# Patient Record
Sex: FEMALE | Race: OTHER | NOT HISPANIC OR LATINO
[De-identification: names, ages, dates, MRNs, and addresses within clinical notes are randomized per-mention and may not be internally consistent; named-entity substitution may affect disease eponyms.]

---

## 2017-02-28 ENCOUNTER — APPOINTMENT (OUTPATIENT)
Dept: INTERNAL MEDICINE | Facility: CLINIC | Age: 22
End: 2017-02-28

## 2017-02-28 VITALS
BODY MASS INDEX: 18.5 KG/M2 | HEIGHT: 61 IN | RESPIRATION RATE: 16 BRPM | HEART RATE: 87 BPM | WEIGHT: 98 LBS | DIASTOLIC BLOOD PRESSURE: 58 MMHG | SYSTOLIC BLOOD PRESSURE: 110 MMHG | TEMPERATURE: 98.6 F | OXYGEN SATURATION: 98 %

## 2017-02-28 DIAGNOSIS — R05 COUGH: ICD-10-CM

## 2017-02-28 DIAGNOSIS — H92.09 OTALGIA, UNSPECIFIED EAR: ICD-10-CM

## 2017-02-28 RX ORDER — CARBAMIDE PEROXIDE 6.5 %
6.5 DROPS OTIC (EAR)
Qty: 1 | Refills: 0 | Status: ACTIVE | COMMUNITY
Start: 2017-02-28 | End: 1900-01-01

## 2017-03-02 ENCOUNTER — APPOINTMENT (OUTPATIENT)
Dept: UROLOGY | Facility: CLINIC | Age: 22
End: 2017-03-02

## 2017-03-02 ENCOUNTER — OUTPATIENT (OUTPATIENT)
Dept: OUTPATIENT SERVICES | Facility: HOSPITAL | Age: 22
LOS: 1 days | End: 2017-03-02
Payer: COMMERCIAL

## 2017-03-02 DIAGNOSIS — R35.0 FREQUENCY OF MICTURITION: ICD-10-CM

## 2017-03-02 DIAGNOSIS — R30.0 DYSURIA: ICD-10-CM

## 2017-03-02 PROCEDURE — 52000 CYSTOURETHROSCOPY: CPT

## 2017-03-08 ENCOUNTER — APPOINTMENT (OUTPATIENT)
Dept: INTERNAL MEDICINE | Facility: CLINIC | Age: 22
End: 2017-03-08

## 2017-03-09 DIAGNOSIS — R30.0 DYSURIA: ICD-10-CM

## 2017-03-17 ENCOUNTER — APPOINTMENT (OUTPATIENT)
Dept: UROLOGY | Facility: CLINIC | Age: 22
End: 2017-03-17

## 2017-09-06 ENCOUNTER — APPOINTMENT (OUTPATIENT)
Dept: INTERNAL MEDICINE | Facility: CLINIC | Age: 22
End: 2017-09-06
Payer: COMMERCIAL

## 2017-09-06 VITALS
SYSTOLIC BLOOD PRESSURE: 90 MMHG | DIASTOLIC BLOOD PRESSURE: 70 MMHG | BODY MASS INDEX: 19.26 KG/M2 | HEART RATE: 72 BPM | OXYGEN SATURATION: 95 % | HEIGHT: 61 IN | WEIGHT: 102 LBS | TEMPERATURE: 99.2 F

## 2017-09-06 DIAGNOSIS — Z87.09 PERSONAL HISTORY OF OTHER DISEASES OF THE RESPIRATORY SYSTEM: ICD-10-CM

## 2017-09-06 LAB — S PYO AG SPEC QL IA: POSITIVE

## 2017-09-06 PROCEDURE — 99213 OFFICE O/P EST LOW 20 MIN: CPT | Mod: 25

## 2017-09-06 PROCEDURE — 87880 STREP A ASSAY W/OPTIC: CPT | Mod: QW

## 2017-09-10 ENCOUNTER — TRANSCRIPTION ENCOUNTER (OUTPATIENT)
Age: 22
End: 2017-09-10

## 2017-09-12 ENCOUNTER — OTHER (OUTPATIENT)
Age: 22
End: 2017-09-12

## 2017-09-12 RX ORDER — AMOXICILLIN 500 MG/1
500 TABLET, FILM COATED ORAL 3 TIMES DAILY
Qty: 21 | Refills: 0 | Status: DISCONTINUED | COMMUNITY
Start: 2017-09-06 | End: 2017-09-12

## 2017-09-12 RX ORDER — AMOXICILLIN 500 MG/1
500 CAPSULE ORAL 3 TIMES DAILY
Qty: 21 | Refills: 0 | Status: DISCONTINUED | COMMUNITY
Start: 2017-02-28 | End: 2017-09-12

## 2019-04-03 ENCOUNTER — TRANSCRIPTION ENCOUNTER (OUTPATIENT)
Age: 24
End: 2019-04-03

## 2019-12-18 ENCOUNTER — LABORATORY RESULT (OUTPATIENT)
Age: 24
End: 2019-12-18

## 2019-12-18 ENCOUNTER — APPOINTMENT (OUTPATIENT)
Dept: INTERNAL MEDICINE | Facility: CLINIC | Age: 24
End: 2019-12-18
Payer: COMMERCIAL

## 2019-12-18 VITALS
DIASTOLIC BLOOD PRESSURE: 70 MMHG | WEIGHT: 116 LBS | OXYGEN SATURATION: 99 % | HEART RATE: 81 BPM | TEMPERATURE: 98.8 F | SYSTOLIC BLOOD PRESSURE: 114 MMHG

## 2019-12-18 PROCEDURE — 99395 PREV VISIT EST AGE 18-39: CPT | Mod: 25

## 2019-12-18 PROCEDURE — 36415 COLL VENOUS BLD VENIPUNCTURE: CPT

## 2019-12-18 RX ORDER — AZITHROMYCIN DIHYDRATE 250 MG/1
250 TABLET, FILM COATED ORAL
Qty: 1 | Refills: 0 | Status: DISCONTINUED | COMMUNITY
Start: 2017-09-12 | End: 2019-12-18

## 2019-12-18 RX ORDER — IBUPROFEN 600 MG/1
600 TABLET, FILM COATED ORAL
Qty: 15 | Refills: 0 | Status: DISCONTINUED | COMMUNITY
Start: 2019-12-12

## 2019-12-18 RX ORDER — TINIDAZOLE 500 MG/1
500 TABLET, FILM COATED ORAL
Qty: 8 | Refills: 0 | Status: DISCONTINUED | COMMUNITY
Start: 2019-08-02

## 2019-12-18 RX ORDER — ALFUZOSIN HYDROCHLORIDE 10 MG/1
10 TABLET, EXTENDED RELEASE ORAL
Qty: 30 | Refills: 3 | Status: DISCONTINUED | COMMUNITY
Start: 2017-03-02 | End: 2019-12-18

## 2019-12-18 RX ORDER — BENZONATATE 200 MG/1
200 CAPSULE ORAL 3 TIMES DAILY
Qty: 21 | Refills: 0 | Status: DISCONTINUED | COMMUNITY
Start: 2017-02-28 | End: 2019-12-18

## 2019-12-18 NOTE — HEALTH RISK ASSESSMENT
[Good] : ~his/her~ current health as good [1 or 2 (0 pts)] : 1 or 2 (0 points) [] : No [Never (0 pts)] : Never (0 points) [de-identified] : regular  [de-identified] : none [Employed] : employed [Change in mental status noted] : No change in mental status noted [With Family] : lives with family [Single] : single [Feels Safe at Home] : Feels safe at home [Reports changes in hearing] : Reports no changes in hearing [Reports changes in dental health] : Reports changes in dental health [Smoke Detector] : smoke detector [Reports changes in vision] : Reports no changes in vision [Seat Belt] :  uses seat belt [Safety elements used in home] : safety elements used in home [Carbon Monoxide Detector] : carbon monoxide detector [PapSmearDate] : 2019 [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

## 2019-12-18 NOTE — ASSESSMENT
[FreeTextEntry1] : - CPE \par \par - diet /exercise discussed \par - check lab\par - UTD PAP\par - refuses flu shot / Tdap - declined

## 2019-12-19 LAB
25(OH)D3 SERPL-MCNC: 19.2 NG/ML
ALBUMIN SERPL ELPH-MCNC: 4.4 G/DL
ALP BLD-CCNC: 47 U/L
ALT SERPL-CCNC: 11 U/L
ANION GAP SERPL CALC-SCNC: 13 MMOL/L
AST SERPL-CCNC: 20 U/L
BASOPHILS # BLD AUTO: 0.03 K/UL
BASOPHILS NFR BLD AUTO: 0.5 %
BILIRUB SERPL-MCNC: 0.2 MG/DL
BUN SERPL-MCNC: 10 MG/DL
CALCIUM SERPL-MCNC: 9.8 MG/DL
CHLORIDE SERPL-SCNC: 103 MMOL/L
CHOLEST SERPL-MCNC: 183 MG/DL
CHOLEST/HDLC SERPL: 3.3 RATIO
CO2 SERPL-SCNC: 22 MMOL/L
CREAT SERPL-MCNC: 0.79 MG/DL
EOSINOPHIL # BLD AUTO: 0.12 K/UL
EOSINOPHIL NFR BLD AUTO: 1.9 %
ESTIMATED AVERAGE GLUCOSE: 103 MG/DL
GLUCOSE SERPL-MCNC: 88 MG/DL
HBA1C MFR BLD HPLC: 5.2 %
HCT VFR BLD CALC: 35.3 %
HDLC SERPL-MCNC: 55 MG/DL
HGB BLD-MCNC: 10.6 G/DL
IMM GRANULOCYTES NFR BLD AUTO: 0.2 %
LDLC SERPL CALC-MCNC: 120 MG/DL
LYMPHOCYTES # BLD AUTO: 2.92 K/UL
LYMPHOCYTES NFR BLD AUTO: 45.7 %
MAN DIFF?: NORMAL
MCHC RBC-ENTMCNC: 24.1 PG
MCHC RBC-ENTMCNC: 30 GM/DL
MCV RBC AUTO: 80.2 FL
MONOCYTES # BLD AUTO: 0.49 K/UL
MONOCYTES NFR BLD AUTO: 7.7 %
NEUTROPHILS # BLD AUTO: 2.82 K/UL
NEUTROPHILS NFR BLD AUTO: 44 %
PLATELET # BLD AUTO: 441 K/UL
POTASSIUM SERPL-SCNC: 4.5 MMOL/L
PROT SERPL-MCNC: 7.8 G/DL
RBC # BLD: 4.4 M/UL
RBC # FLD: 16.9 %
SODIUM SERPL-SCNC: 138 MMOL/L
TRIGL SERPL-MCNC: 40 MG/DL
TSH SERPL-ACNC: 0.91 UIU/ML
WBC # FLD AUTO: 6.39 K/UL

## 2021-03-09 ENCOUNTER — LABORATORY RESULT (OUTPATIENT)
Age: 26
End: 2021-03-09

## 2021-03-09 ENCOUNTER — APPOINTMENT (OUTPATIENT)
Dept: INTERNAL MEDICINE | Facility: CLINIC | Age: 26
End: 2021-03-09
Payer: COMMERCIAL

## 2021-03-09 VITALS
BODY MASS INDEX: 23.41 KG/M2 | WEIGHT: 124 LBS | DIASTOLIC BLOOD PRESSURE: 80 MMHG | OXYGEN SATURATION: 98 % | HEIGHT: 61 IN | HEART RATE: 78 BPM | SYSTOLIC BLOOD PRESSURE: 110 MMHG | TEMPERATURE: 98.1 F

## 2021-03-09 DIAGNOSIS — Z00.00 ENCOUNTER FOR GENERAL ADULT MEDICAL EXAMINATION W/OUT ABNORMAL FINDINGS: ICD-10-CM

## 2021-03-09 PROCEDURE — 36415 COLL VENOUS BLD VENIPUNCTURE: CPT

## 2021-03-09 PROCEDURE — 99072 ADDL SUPL MATRL&STAF TM PHE: CPT

## 2021-03-09 PROCEDURE — 99395 PREV VISIT EST AGE 18-39: CPT | Mod: 25

## 2021-03-09 NOTE — PHYSICAL EXAM
[No Edema] : there was no peripheral edema [Normal Supraclavicular Nodes] : no supraclavicular lymphadenopathy [Normal Posterior Cervical Nodes] : no posterior cervical lymphadenopathy [Normal Anterior Cervical Nodes] : no anterior cervical lymphadenopathy [No CVA Tenderness] : no CVA  tenderness [No Spinal Tenderness] : no spinal tenderness [Normal] : affect was normal and insight and judgment were intact

## 2021-03-09 NOTE — ASSESSMENT
[FreeTextEntry1] : 1)  CPE \par \par - diet / exercise discussed \par - check labs\par - pap - UTD / tdap- thinks she has had it\par - refuses flu shot

## 2021-03-10 ENCOUNTER — TRANSCRIPTION ENCOUNTER (OUTPATIENT)
Age: 26
End: 2021-03-10

## 2021-03-10 LAB
ALBUMIN SERPL ELPH-MCNC: 4.2 G/DL
ALP BLD-CCNC: 47 U/L
ALT SERPL-CCNC: 11 U/L
ANION GAP SERPL CALC-SCNC: 12 MMOL/L
AST SERPL-CCNC: 20 U/L
BASOPHILS # BLD AUTO: 0.03 K/UL
BASOPHILS NFR BLD AUTO: 0.4 %
BILIRUB SERPL-MCNC: 0.2 MG/DL
BUN SERPL-MCNC: 15 MG/DL
C TRACH RRNA SPEC QL NAA+PROBE: NOT DETECTED
CALCIUM SERPL-MCNC: 9.2 MG/DL
CHLORIDE SERPL-SCNC: 107 MMOL/L
CHOLEST SERPL-MCNC: 202 MG/DL
CO2 SERPL-SCNC: 20 MMOL/L
CREAT SERPL-MCNC: 0.75 MG/DL
EOSINOPHIL # BLD AUTO: 0.08 K/UL
EOSINOPHIL NFR BLD AUTO: 1.1 %
ESTIMATED AVERAGE GLUCOSE: 105 MG/DL
GLUCOSE SERPL-MCNC: 75 MG/DL
HBA1C MFR BLD HPLC: 5.3 %
HCT VFR BLD CALC: 35.4 %
HDLC SERPL-MCNC: 56 MG/DL
HGB BLD-MCNC: 10.6 G/DL
HIV1+2 AB SPEC QL IA.RAPID: NONREACTIVE
IMM GRANULOCYTES NFR BLD AUTO: 0.3 %
LDLC SERPL CALC-MCNC: 137 MG/DL
LYMPHOCYTES # BLD AUTO: 2.52 K/UL
LYMPHOCYTES NFR BLD AUTO: 34.6 %
MAN DIFF?: NORMAL
MCHC RBC-ENTMCNC: 24.4 PG
MCHC RBC-ENTMCNC: 29.9 GM/DL
MCV RBC AUTO: 81.6 FL
MONOCYTES # BLD AUTO: 0.58 K/UL
MONOCYTES NFR BLD AUTO: 8 %
N GONORRHOEA RRNA SPEC QL NAA+PROBE: NOT DETECTED
NEUTROPHILS # BLD AUTO: 4.05 K/UL
NEUTROPHILS NFR BLD AUTO: 55.6 %
NONHDLC SERPL-MCNC: 146 MG/DL
PLATELET # BLD AUTO: 491 K/UL
POTASSIUM SERPL-SCNC: 4.4 MMOL/L
PROT SERPL-MCNC: 7.6 G/DL
RBC # BLD: 4.34 M/UL
RBC # FLD: 18.3 %
SODIUM SERPL-SCNC: 138 MMOL/L
SOURCE AMPLIFICATION: NORMAL
T PALLIDUM AB SER QL IA: NEGATIVE
TRIGL SERPL-MCNC: 46 MG/DL
TSH SERPL-ACNC: 0.53 UIU/ML
WBC # FLD AUTO: 7.28 K/UL

## 2021-03-11 LAB — FERRITIN SERPL-MCNC: 7 NG/ML

## 2021-03-12 RX ORDER — ERGOCALCIFEROL 1.25 MG/1
1.25 MG CAPSULE, LIQUID FILLED ORAL
Qty: 4 | Refills: 3 | Status: ACTIVE | COMMUNITY
Start: 2019-12-19 | End: 1900-01-01

## 2021-12-31 ENCOUNTER — NON-APPOINTMENT (OUTPATIENT)
Age: 26
End: 2021-12-31

## 2022-01-04 ENCOUNTER — APPOINTMENT (OUTPATIENT)
Dept: INTERNAL MEDICINE | Facility: CLINIC | Age: 27
End: 2022-01-04
Payer: COMMERCIAL

## 2022-01-04 VITALS
DIASTOLIC BLOOD PRESSURE: 50 MMHG | WEIGHT: 125 LBS | HEART RATE: 76 BPM | BODY MASS INDEX: 23.62 KG/M2 | OXYGEN SATURATION: 99 % | SYSTOLIC BLOOD PRESSURE: 90 MMHG | TEMPERATURE: 98.3 F

## 2022-01-04 DIAGNOSIS — N76.0 ACUTE VAGINITIS: ICD-10-CM

## 2022-01-04 DIAGNOSIS — Z11.59 ENCOUNTER FOR SCREENING FOR OTHER VIRAL DISEASES: ICD-10-CM

## 2022-01-04 PROCEDURE — 99214 OFFICE O/P EST MOD 30 MIN: CPT

## 2022-01-04 NOTE — ASSESSMENT
[FreeTextEntry1] : PCR for COVID screening sent\par \par BV panel sent along with STDs and A1C d/t recurrent yeast infections.  Gyn f/u advised.  \par

## 2022-01-04 NOTE — PHYSICAL EXAM
[No Acute Distress] : no acute distress [Well-Appearing] : well-appearing [Normal Voice/Communication] : normal voice/communication [External Female Genitalia] : normal external genitalia [Normal Inguinal Nodes] : no inguinal lymphadenopathy [Normal Femoral Nodes] : no femoral lymphadenopathy [Normal] : no CVA or spinal tenderness

## 2022-01-04 NOTE — HISTORY OF PRESENT ILLNESS
[FreeTextEntry8] : 26 y.o. female with no significant past medical history.  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  \par \par Also with UTI/yeast infection symptoms, mainly vaginal itching x 2 days.  Some white, slightly thick discharge.  \par Has been treated for yeast infection 3-4 times in the past year.  Last was several months ago, but reports swab at gyn was normal at that time, despite symptoms.  Took fluconazole with relief of symptoms.    \par Did do an extended treatment of 1 pill/week x 3-4 weeks, did resolved for longer period of time after that, but returned. \par No pelvic discomfort.  \par No dysuria, no hematuria.  \par

## 2022-01-06 LAB
APPEARANCE: CLEAR
BILIRUBIN URINE: NEGATIVE
BLOOD URINE: NEGATIVE
C TRACH RRNA SPEC QL NAA+PROBE: NOT DETECTED
CANDIDA VAG CYTO: DETECTED
COLOR: NORMAL
ESTIMATED AVERAGE GLUCOSE: 97 MG/DL
G VAGINALIS+PREV SP MTYP VAG QL MICRO: NOT DETECTED
GLUCOSE QUALITATIVE U: NEGATIVE
HBA1C MFR BLD HPLC: 5 %
HCG SERPL-MCNC: <1 MIU/ML
HCV AB SER QL: NONREACTIVE
HCV S/CO RATIO: 0.28 S/CO
HIV1+2 AB SPEC QL IA.RAPID: NONREACTIVE
KETONES URINE: NEGATIVE
LEUKOCYTE ESTERASE URINE: NEGATIVE
N GONORRHOEA RRNA SPEC QL NAA+PROBE: NOT DETECTED
NITRITE URINE: NEGATIVE
PH URINE: 5.5
PROTEIN URINE: NEGATIVE
SOURCE AMPLIFICATION: NORMAL
SPECIFIC GRAVITY URINE: 1.02
T PALLIDUM AB SER QL IA: NEGATIVE
T VAGINALIS VAG QL WET PREP: NOT DETECTED
UROBILINOGEN URINE: NORMAL

## 2022-01-10 LAB — SARS-COV-2 N GENE NPH QL NAA+PROBE: NOT DETECTED

## 2022-01-28 ENCOUNTER — APPOINTMENT (OUTPATIENT)
Dept: INTERNAL MEDICINE | Facility: CLINIC | Age: 27
End: 2022-01-28
Payer: COMMERCIAL

## 2022-01-28 VITALS
TEMPERATURE: 98.4 F | WEIGHT: 125.5 LBS | BODY MASS INDEX: 23.7 KG/M2 | DIASTOLIC BLOOD PRESSURE: 68 MMHG | OXYGEN SATURATION: 98 % | HEART RATE: 72 BPM | HEIGHT: 61 IN | SYSTOLIC BLOOD PRESSURE: 100 MMHG

## 2022-01-28 DIAGNOSIS — L60.8 OTHER NAIL DISORDERS: ICD-10-CM

## 2022-01-28 PROCEDURE — 99213 OFFICE O/P EST LOW 20 MIN: CPT

## 2022-01-28 RX ORDER — TERCONAZOLE 8 MG/G
0.8 CREAM VAGINAL
Qty: 1 | Refills: 1 | Status: ACTIVE | COMMUNITY
Start: 2022-01-28 | End: 1900-01-01

## 2022-01-28 RX ORDER — MUPIROCIN 20 MG/G
2 OINTMENT TOPICAL
Qty: 1 | Refills: 1 | Status: ACTIVE | COMMUNITY
Start: 2022-01-28 | End: 1900-01-01

## 2022-01-28 RX ORDER — CLOTRIMAZOLE AND BETAMETHASONE DIPROPIONATE 10; .5 MG/G; MG/G
1-0.05 CREAM TOPICAL TWICE DAILY
Qty: 1 | Refills: 1 | Status: DISCONTINUED | COMMUNITY
Start: 2022-01-06 | End: 2022-01-28

## 2022-01-28 RX ORDER — FLUCONAZOLE 150 MG/1
150 TABLET ORAL
Qty: 3 | Refills: 0 | Status: ACTIVE | COMMUNITY
Start: 2022-01-06 | End: 1900-01-01

## 2022-01-28 NOTE — HISTORY OF PRESENT ILLNESS
[FreeTextEntry8] : 26 y.o. female, PMHx recurrent vaginitis, no other significant medical history. \par \par cc: woke up 2 days ago with left pinky swelling and pain towards the end of the finger/nail.  \par Noticed some abnormal growth of the nail for about the past month.  \par  to touch, but overall pain and swelling better over the last day.  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  Keeps nails long \par

## 2022-01-28 NOTE — PHYSICAL EXAM
[No Acute Distress] : no acute distress [Well-Appearing] : well-appearing [Normal] : no joint swelling and grossly normal strength and tone [de-identified] : mild redness and swelling of distal pad of left 5th finger, uplifting of left 5th fingernail

## 2022-01-28 NOTE — ASSESSMENT
[FreeTextEntry1] : Nail pain and lifting:\par Specimen sent for fungal culture, but nail not thickened or discolored\par Will try to avoid oral antibiotics given her h/o vaginal yeast infections\par Warm salt water soaks for now and to apply mupirocin ointment under the nail  after soaking\par Patient to call if any worsening and will treat with oral antibiotic \par

## 2022-02-09 RX ORDER — AMOXICILLIN AND CLAVULANATE POTASSIUM 875; 125 MG/1; MG/1
875-125 TABLET, COATED ORAL
Qty: 14 | Refills: 0 | Status: ACTIVE | COMMUNITY
Start: 2022-02-09 | End: 1900-01-01

## 2022-03-02 LAB — FUNGUS SPEC CULT ORG #8: ABNORMAL

## 2022-04-11 PROBLEM — Z11.59 SCREENING FOR VIRAL DISEASE: Status: ACTIVE | Noted: 2022-01-04

## 2022-07-27 ENCOUNTER — RX RENEWAL (OUTPATIENT)
Age: 27
End: 2022-07-27

## 2025-06-04 ENCOUNTER — NON-APPOINTMENT (OUTPATIENT)
Age: 30
End: 2025-06-04